# Patient Record
(demographics unavailable — no encounter records)

---

## 2024-12-06 NOTE — ASSESSMENT
[FreeTextEntry1] : An EKG was performed to evaluate for arrhythmia and ischemia.  We discussed the need to avoid alcohol and caffeine to excess as well as avoiding systemic decongestants   Increase aerobics and reduce weight training  CPET and preventive Cardiology evaluation with Dr López of Preventive Cardiology  MV excrescence-- I suggested meeting Dr Brodie [facility] of Structural [facility] in the future  Hyperlipidemia-- on Rosuvastatin with Zero Caclium score  palpitations and chest discomfort-- may be reflux. We discussed concern about arrhythmia leading to A Fib  Limit alcohol to 1-2 units in 24 hours max  Consider Ziopatch if palpitatioins increase  Patient will followup with Cardiology at [facility] and requests 3 weeks of Rosuvastin  I encouraged continued risk factor reduction and gradual increase in aerobic activity as tolerated

## 2024-12-06 NOTE — HISTORY OF PRESENT ILLNESS
[FreeTextEntry1] : 55 year male who notes having a feeling of irregular heart beat and discomfort lying down on days when he has had more wine. He saw his PCP who referred him to Cardiology at [facility] and has copies of multiple studies including Cardiac MRI with 4 mm excrescence on his mitral valve and calcium score of zero. He had labs performed online including LDL particle sizes and started Rosuvastatin 5 mg every other day with the Rx of his Cardiologist at [facility]. He denies having any  SOB, RAMAN, dizzines, orthopnea, PND or syncope

## 2024-12-24 NOTE — HISTORY OF PRESENT ILLNESS
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 Perioral dermatitis -- derm referral and topical metronidazole. May need PA Hair loss -- try minoxidil 2.5, increase to 5 if needed and not getting lightheaded. Low grade transaminitis noted on private labs from October. EtOH twice week vary amounts 1-6. 6 drinks about twice a month More psychological stress after job issues. Sleep has worsened lately with sleep latency. 1mg melatonin some nights. Camomile extract. Taking magnesium. Tried Ashwaganda but didn't help. Uses blue light filter and working on mindfulness. Uses Synchroneuron for biomarkers q6 month for $500/year for primary prevention. Had PSA tested last year. Colonoscopy last year needs 3 year f/u; likely had high risk polyp Got Tdap, Hep A, Hep B prior to trip to [facility] Going to [facility] and [facility] soon for vacation. Discussed traveller's diarrhea prevention. He isn't going to [place]. Got PSA last year Routine STI sometime in 2025 Will get Shingles and PCV20 at next visit.

## 2024-12-24 NOTE — PHYSICAL EXAM
[No Acute Distress] : no acute distress [Normal Sclera/Conjunctiva] : normal sclera/conjunctiva [No Varicosities] : no varicosities [No Edema] : there was no peripheral edema [No Extremity Clubbing/Cyanosis] : no extremity clubbing/cyanosis [Coordination Grossly Intact] : coordination grossly intact [No Focal Deficits] : no focal deficits [Normal] : affect was normal and insight and judgment were intact [de-identified] : erythematous nontender patch lateral to L eye not involving eyelid, sclera, or conjunctiva [de-identified] : perioral dermatitis

## 2024-12-24 NOTE — HEALTH RISK ASSESSMENT
[Yes] : Yes [2 - 3 times a week (3 pts)] : 2 - 3  times a week (3 points) [3 or 4 (1 pt)] : 3 or 4  (1 point) [Monthly (2 pts)] : Monthly (2 points) [Audit-CScore] : 6 [de-identified] : weight training

## 2025-01-23 NOTE — PHYSICAL EXAM
[FreeTextEntry3] : eyelids, nasal fold and NL skin, chin- scaling erythematous ill defined plaques with some lichenification   right lateral neck: appx1-2cm mobile subQ nodule, + punctum

## 2025-01-23 NOTE — ASSESSMENT
[FreeTextEntry1] : #Recurrent facial eczematous eruption Favor allergic contact dermatitis.  Counseling on ACD provided.  Gentle skincare counselling provided in addition to avoidance of using too many topicals, and avoiding fragranced creams/topicals.  recommended the following -stop all current skincare products, including retinol for at least 1-2 months -stop using gym towels, may be causing issue  -stop current topical rx , discussed that neosporin should be avoided in general bc high rates of developing ACD with continued use -start HCN 2.5% ointment bid for two weeks on and two weeks off as needed. -start gentle emollient such as aquaphor , vaseline, or cerave healing ointment only until rash completely resolved -f/u if not significantly improved/resolved after 6-8 weeks or sooner if there is worsening. Patch testing was discussed as further mode of diagnostic evaluation.   #probable EIC- right lateral neck Benign.  Excision in procedure discussed since he wants removed and it has caused irritation in the past. He aware that there will be a scar, would like to move forward with removal.  He will schedule appt for excision.   RTC 2 month or sooner prn

## 2025-01-23 NOTE — HISTORY OF PRESENT ILLNESS
[FreeTextEntry1] : NPV- facial rash [de-identified] : Isrrael Garcia 54 y/o M presents for rash on face - Started mid-December - Red, itchy, and scaly  - involves eyelids, nasal folds, NL folds, chin, and neck. no photos. has been better lately.  - Went away after going to [place], thinks salt water helped. But came back after coming back to [place] 2 weeks ago.  - Has tried pimecrolimus cream, hydrocortisone, dexyane, Neomycin & Polymyxin B.  - moisturizing with cerave 30 PM  - using retinoid (adapalene) and SA  wash from [facility]. 1x/week - sunscreen 1x/ week , using zinc oxide - work at home in front of computer - working out in gym and wipes with their towels (non scented). thinks may be triggered by brown paper towels. stopped since Dec 13.   #bump on right neck. occasionally gets irritated and squeezes out thick content. wants removed.   Personal history of skin cancer: No Family history of skin cancer: No History of blistering sunburns: No History of tanning bed use: Yes Uses sunscreen regularly: No

## 2025-02-13 NOTE — HISTORY OF PRESENT ILLNESS
[FreeTextEntry1] : 55M w HTN, known MV excrescence who was referred for CVD prevention and CPET  2/13/25 NEW: had no sx during CPET - reports sx fluctuate. not currently having palps - no strong fam hx of coronary dse, athero, strokes - highly active and fit - heavily researched cholesterol management and is now taking rosuva 5 nightly but notes increase in LDL particles, small and medium sized ones on last fractionation - negative Lp (a)

## 2025-06-24 NOTE — PHYSICAL EXAM
[No Acute Distress] : no acute distress [Normal Outer Ear/Nose] : the outer ears and nose were normal in appearance [No Respiratory Distress] : no respiratory distress  [Normal] : affect was normal and insight and judgment were intact

## 2025-06-24 NOTE — HISTORY OF PRESENT ILLNESS
[Home] : at home, [unfilled] , at the time of the visit. [Medical Office: ([facility])___] : at the medical office located in  [Telehealth (audio & video)] : This visit was provided via telehealth using real-time 2-way audio visual technology. [Verbal consent obtained from patient] : the patient, [unfilled] [FreeTextEntry1] : televideo for labs [de-identified] : 56 M PMHx HLD calling to discuss Function Health labs. Labs drawn 4/28/25 for subsequent Function Health assessment. He included optional allergy testing due to atopic dermatitis. skin better, no allergens identified. Started ezetimibe per [place] cardiologist in conjunction with rosuva 5mg. These labs are reflective of rosuva change but not ezetimibe change. SHBG is elevated. He will try stopping DHEA Zinc low, will supplement and take at a different time from Mg. CPET done 2/13/2025 153% age predicted. No indication to repeat. Next labs in November with function.